# Patient Record
Sex: MALE | Race: WHITE | HISPANIC OR LATINO | Employment: FULL TIME | URBAN - METROPOLITAN AREA
[De-identification: names, ages, dates, MRNs, and addresses within clinical notes are randomized per-mention and may not be internally consistent; named-entity substitution may affect disease eponyms.]

---

## 2023-01-27 ENCOUNTER — OCCMED (OUTPATIENT)
Dept: URGENT CARE | Facility: CLINIC | Age: 38
End: 2023-01-27

## 2023-01-27 ENCOUNTER — APPOINTMENT (OUTPATIENT)
Dept: RADIOLOGY | Facility: CLINIC | Age: 38
End: 2023-01-27

## 2023-01-27 DIAGNOSIS — S50.02XA CONTUSION OF LEFT ELBOW, INITIAL ENCOUNTER: ICD-10-CM

## 2023-01-27 DIAGNOSIS — S42.401A CLOSED FRACTURE OF RIGHT ELBOW, INITIAL ENCOUNTER: Primary | ICD-10-CM

## 2023-01-27 DIAGNOSIS — M25.521 RIGHT ELBOW PAIN: ICD-10-CM

## 2023-01-27 DIAGNOSIS — S20.211A RIB CONTUSION, RIGHT, INITIAL ENCOUNTER: ICD-10-CM

## 2023-01-27 DIAGNOSIS — R07.81 RIB PAIN ON RIGHT SIDE: ICD-10-CM

## 2023-01-30 ENCOUNTER — OCCMED (OUTPATIENT)
Dept: URGENT CARE | Facility: CLINIC | Age: 38
End: 2023-01-30

## 2023-01-30 DIAGNOSIS — S42.401D CLOSED FRACTURE OF DISTAL END OF RIGHT HUMERUS WITH ROUTINE HEALING, UNSPECIFIED FRACTURE MORPHOLOGY, SUBSEQUENT ENCOUNTER: Primary | ICD-10-CM

## 2023-01-31 ENCOUNTER — HOSPITAL ENCOUNTER (OUTPATIENT)
Dept: CT IMAGING | Facility: CLINIC | Age: 38
Discharge: HOME | End: 2023-01-31

## 2023-01-31 VITALS
DIASTOLIC BLOOD PRESSURE: 77 MMHG | BODY MASS INDEX: 26.55 KG/M2 | WEIGHT: 196 LBS | HEIGHT: 72 IN | HEART RATE: 85 BPM | SYSTOLIC BLOOD PRESSURE: 127 MMHG

## 2023-01-31 DIAGNOSIS — S52.121A CLOSED DISPLACED FRACTURE OF HEAD OF RIGHT RADIUS, INITIAL ENCOUNTER: ICD-10-CM

## 2023-01-31 DIAGNOSIS — S52.121A CLOSED DISPLACED FRACTURE OF HEAD OF RIGHT RADIUS, INITIAL ENCOUNTER: Primary | ICD-10-CM

## 2023-01-31 RX ORDER — IBUPROFEN 800 MG/1
TABLET ORAL EVERY 6 HOURS PRN
COMMUNITY
End: 2023-02-10
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
## 2023-02-07 ENCOUNTER — OFFICE VISIT (OUTPATIENT)
Dept: OBGYN CLINIC | Facility: MEDICAL CENTER | Age: 38
End: 2023-02-07

## 2023-02-07 ENCOUNTER — APPOINTMENT (OUTPATIENT)
Dept: RADIOLOGY | Facility: MEDICAL CENTER | Age: 38
End: 2023-02-07

## 2023-02-07 VITALS
DIASTOLIC BLOOD PRESSURE: 77 MMHG | BODY MASS INDEX: 29.69 KG/M2 | HEIGHT: 72 IN | HEART RATE: 61 BPM | WEIGHT: 219.2 LBS | SYSTOLIC BLOOD PRESSURE: 131 MMHG

## 2023-02-07 DIAGNOSIS — S53.104A CLOSED DISLOCATION OF RIGHT ELBOW, INITIAL ENCOUNTER: ICD-10-CM

## 2023-02-07 DIAGNOSIS — M25.522 PAIN IN LEFT ELBOW: ICD-10-CM

## 2023-02-07 DIAGNOSIS — S52.121A CLOSED DISPLACED FRACTURE OF HEAD OF RIGHT RADIUS, INITIAL ENCOUNTER: ICD-10-CM

## 2023-02-07 DIAGNOSIS — M25.522 PAIN IN LEFT ELBOW: Primary | ICD-10-CM

## 2023-02-07 RX ORDER — CHLORHEXIDINE GLUCONATE 0.12 MG/ML
15 RINSE ORAL ONCE
Status: CANCELLED | OUTPATIENT
Start: 2023-02-07 | End: 2023-02-07

## 2023-02-07 RX ORDER — CHLORHEXIDINE GLUCONATE 4 G/100ML
SOLUTION TOPICAL DAILY PRN
Status: CANCELLED | OUTPATIENT
Start: 2023-02-07

## 2023-02-07 RX ORDER — CEFAZOLIN SODIUM 2 G/50ML
2000 SOLUTION INTRAVENOUS ONCE
Status: CANCELLED | OUTPATIENT
Start: 2023-02-10 | End: 2023-02-07

## 2023-02-07 NOTE — H&P (VIEW-ONLY)
[facility] - LINDSAY L KRAK[facility] CARE SPECIALISTS [facility] RUSH Steinberg [address] [place] [id number]       Leia Tavera  [id number]  1985    ORTHOPAEDIC SURGERY OUTPATIENT NOTE  2/7/2023      HISTORY:  44 y o  male with right elbow fracture dislocation who presents for follow and review of his CT scan  The patinet states that he has had continued stiffness of the right elbow range of motion and notes pain in the left elbow and wrist with new bruising  The patient has had no new numbness or tingling in either bilateral upper extremity  A  was offered, but the patient opted to proceed utilizing a family member for interpretation  History reviewed  No pertinent past medical history  History reviewed  No pertinent surgical history  Social History     Socioeconomic History   • Marital status: /Civil Union     Spouse name: Not on file   • Number of children: Not on file   • Years of education: Not on file   • Highest education level: Not on file   Occupational History   • Not on file   Tobacco Use   • Smoking status: Every Day     Packs/day: 1 00     Types: Cigarettes   • Smokeless tobacco: Never   Substance and Sexual Activity   • Alcohol use: Not on file   • Drug use: Not on file   • Sexual activity: Not on file   Other Topics Concern   • Not on file   Social History Narrative   • Not on file     Social Determinants of Health     Financial Resource Strain: Not on file   Food Insecurity: Not on file   Transportation Needs: Not on file   Physical Activity: Not on file   Stress: Not on file   Social Connections: Not on file   Intimate Partner Violence: Not on file   Housing Stability: Not on file       History reviewed  No pertinent family history       Patient's Medications   New Prescriptions    No medications on file   Previous Medications    IBUPROFEN (MOTRIN) 800 MG TABLET    Take by mouth every 6 (six) hours as needed for mild pain   Modified Medications    No medications on file   Discontinued Medications    No medications on file       No Known Allergies     /77 (BP Location: Left arm, Patient Position: Sitting, Cuff Size: Standard)   Pulse 61   Ht 6' (1 829 m)   Wt 99 4 kg (219 lb 3 2 oz)   BMI 29 73 kg/m²      REVIEW OF SYSTEMS:  Constitutional: Negative  HEENT: Negative  Respiratory: Negative  Skin: Negative  Neurological: Negative  Psychiatric/Behavioral: Negative  Musculoskeletal: Negative except for that mentioned in the HPI      /77 (BP Location: Left arm, Patient Position: Sitting, Cuff Size: Standard)   Pulse 61   Ht 6' (1 829 m)   Wt 99 4 kg (219 lb 3 2 oz)   BMI 29 73 kg/m²   Gen: No acute distress, resting comfortably in bed  HEENT: Eyes clear, moist mucus membranes, hearing intact  Respiratory: No audible wheezing or stridor  Cardiovascular: Well Perfused peripherally, 2+ distal pulse  Abdomen: nondistended, no peritoneal signs     PHYSICAL EXAM:    RIGHT ELBOW:    Appearance: ecchymosis laterally, intact skin    Flexion: 1200 degrees  Extension: 40 degrees  Pronation: 80 degrees  Supination: 80 degrees    TTP Lateral Epicondyle: negative  TTP Medial Epicondyle: negative  TTP Olecranon: negative  TTP Radial Head: positive  TTP Biceps Tendon: negative    Strength: deferred     Pain with resisted wrist extension: negative  Pain with resisted 3rd finger extension: negative  Pain with resisted wrist flexion: negative    Stability exam: deferred due to pain    Cubital tunnel Tinel's: negative    Radial/median/ulnar nerve intact    <2 sec cap refill    LEFT ELBOW:    Appearance: ecchymosis over the medial elbow    Flexion: 140 degrees  Extension: 0 degrees  Pronation: 80 degrees  Supination: 80 degrees    TTP Lateral Epicondyle: negative  TTP Medial Epicondyle: negative  TTP Olecranon: negative  TTP Radial Head: positive  TTP Biceps Tendon: negative    Strength:  Flexion: 5/5  Extension: 5/5  Pronation: 5/5  Supination: 5/5    Pain with resisted wrist extension: negative  Pain with resisted 3rd finger extension: negative  Pain with resisted wrist flexion: negative    Varus laxity: negative  Valgus laxity: negative  Milking maneuver: negative  Moving valgus stress test: negative    Cubital tunnel Tinel's: negative    Radial/median/ulnar nerve intact    <2 sec cap refill          IMAGING:  CT scan right elbow was reviewed and my independent read is as follows: right radial head fracture with comminution and 2 loose bodies posteriorly medial and laterally in the joint  Nondisplaced coronoid fracture  X-ray left elbow was reviewed and my independent interpretation is as follows: left concentrically reduced elbow with a nondisplaced radial head fracture    ASSESSMENT AND PLAN:  44 y o  male  With right radial head fracture with comminution and two loose bodies and left nondisplaced radial head fracture  Discussion was had with the patient that he will require operative intervention for his pathology  Treatment options include ORIF with arthroscopic vs  Open loose body removal vs  Possible radial head arthoplasty  The patient will be examined under anesthesia to assess for ligamentous injury and that depending on the exam he may also require ligamentous repair  He was advised that he will not require surgical intervention for the left elbow, but should continue range of motion exercises and avoid lifting greater than 5 lbs  The patient understands the risks and benefits of the procedure with risks including pain, stiffness, infection, neurovascular injury, recurrence of symptoms, failure of surgical procedure, inadvertent intraoperative complications, blood loss, blood clots, allergic reaction to anesthesia, stroke, heart attack, all up to and including to death  The patient understood and did consent for surgery today       The case request was placed and informed consent obtained utilizing the patient's family for translation per patient request  Return to clinic post op for suture removal   Repeat x-rays of the bilateral elbows at his post op visit

## 2023-02-07 NOTE — PROGRESS NOTES
[facility] - LINDSAY L KRAK[facility] CARE SPECIALISTS [facility] RUSH Steinberg [address] [place] [id number]       Melanie Hoffmann  [id number]  1985    ORTHOPAEDIC SURGERY OUTPATIENT NOTE  2/7/2023      HISTORY:  44 y o  male with right elbow fracture dislocation who presents for follow and review of his CT scan  The patinet states that he has had continued stiffness of the right elbow range of motion and notes pain in the left elbow and wrist with new bruising  The patient has had no new numbness or tingling in either bilateral upper extremity  A  was offered, but the patient opted to proceed utilizing a family member for interpretation  History reviewed  No pertinent past medical history  History reviewed  No pertinent surgical history  Social History     Socioeconomic History   • Marital status: /Civil Union     Spouse name: Not on file   • Number of children: Not on file   • Years of education: Not on file   • Highest education level: Not on file   Occupational History   • Not on file   Tobacco Use   • Smoking status: Every Day     Packs/day: 1 00     Types: Cigarettes   • Smokeless tobacco: Never   Substance and Sexual Activity   • Alcohol use: Not on file   • Drug use: Not on file   • Sexual activity: Not on file   Other Topics Concern   • Not on file   Social History Narrative   • Not on file     Social Determinants of Health     Financial Resource Strain: Not on file   Food Insecurity: Not on file   Transportation Needs: Not on file   Physical Activity: Not on file   Stress: Not on file   Social Connections: Not on file   Intimate Partner Violence: Not on file   Housing Stability: Not on file       History reviewed  No pertinent family history       Patient's Medications   New Prescriptions    No medications on file   Previous Medications    IBUPROFEN (MOTRIN) 800 MG TABLET    Take by mouth every 6 (six) hours as needed for mild pain   Modified Medications    No medications on file   Discontinued Medications    No medications on file       No Known Allergies     /77 (BP Location: Left arm, Patient Position: Sitting, Cuff Size: Standard)   Pulse 61   Ht 6' (1 829 m)   Wt 99 4 kg (219 lb 3 2 oz)   BMI 29 73 kg/m²      REVIEW OF SYSTEMS:  Constitutional: Negative  HEENT: Negative  Respiratory: Negative  Skin: Negative  Neurological: Negative  Psychiatric/Behavioral: Negative  Musculoskeletal: Negative except for that mentioned in the HPI      /77 (BP Location: Left arm, Patient Position: Sitting, Cuff Size: Standard)   Pulse 61   Ht 6' (1 829 m)   Wt 99 4 kg (219 lb 3 2 oz)   BMI 29 73 kg/m²   Gen: No acute distress, resting comfortably in bed  HEENT: Eyes clear, moist mucus membranes, hearing intact  Respiratory: No audible wheezing or stridor  Cardiovascular: Well Perfused peripherally, 2+ distal pulse  Abdomen: nondistended, no peritoneal signs     PHYSICAL EXAM:    RIGHT ELBOW:    Appearance: ecchymosis laterally, intact skin    Flexion: 1200 degrees  Extension: 40 degrees  Pronation: 80 degrees  Supination: 80 degrees    TTP Lateral Epicondyle: negative  TTP Medial Epicondyle: negative  TTP Olecranon: negative  TTP Radial Head: positive  TTP Biceps Tendon: negative    Strength: deferred     Pain with resisted wrist extension: negative  Pain with resisted 3rd finger extension: negative  Pain with resisted wrist flexion: negative    Stability exam: deferred due to pain    Cubital tunnel Tinel's: negative    Radial/median/ulnar nerve intact    <2 sec cap refill    LEFT ELBOW:    Appearance: ecchymosis over the medial elbow    Flexion: 140 degrees  Extension: 0 degrees  Pronation: 80 degrees  Supination: 80 degrees    TTP Lateral Epicondyle: negative  TTP Medial Epicondyle: negative  TTP Olecranon: negative  TTP Radial Head: positive  TTP Biceps Tendon: negative    Strength:  Flexion: 5/5  Extension: 5/5  Pronation: 5/5  Supination: 5/5    Pain with resisted wrist extension: negative  Pain with resisted 3rd finger extension: negative  Pain with resisted wrist flexion: negative    Varus laxity: negative  Valgus laxity: negative  Milking maneuver: negative  Moving valgus stress test: negative    Cubital tunnel Tinel's: negative    Radial/median/ulnar nerve intact    <2 sec cap refill          IMAGING:  CT scan right elbow was reviewed and my independent read is as follows: right radial head fracture with comminution and 2 loose bodies posteriorly medial and laterally in the joint  Nondisplaced coronoid fracture  X-ray left elbow was reviewed and my independent interpretation is as follows: left concentrically reduced elbow with a nondisplaced radial head fracture    ASSESSMENT AND PLAN:  44 y o  male  With right radial head fracture with comminution and two loose bodies and left nondisplaced radial head fracture  Discussion was had with the patient that he will require operative intervention for his pathology  Treatment options include ORIF with arthroscopic vs  Open loose body removal vs  Possible radial head arthoplasty  The patient will be examined under anesthesia to assess for ligamentous injury and that depending on the exam he may also require ligamentous repair  He was advised that he will not require surgical intervention for the left elbow, but should continue range of motion exercises and avoid lifting greater than 5 lbs  The patient understands the risks and benefits of the procedure with risks including pain, stiffness, infection, neurovascular injury, recurrence of symptoms, failure of surgical procedure, inadvertent intraoperative complications, blood loss, blood clots, allergic reaction to anesthesia, stroke, heart attack, all up to and including to death  The patient understood and did consent for surgery today       The case request was placed and informed consent obtained utilizing the patient's family for translation per patient request  Return to clinic post op for suture removal   Repeat x-rays of the bilateral elbows at his post op visit

## 2023-02-08 NOTE — TELEPHONE ENCOUNTER
Caller: [employer]    Doctor: Rose Conway    Reason for call: Requested office visit note to be faxed over,  Fax # [phone]    Notes were electronically faxed    Call back#: [phone]

## 2023-02-09 NOTE — PRE-PROCEDURE INSTRUCTIONS
Pre-Surgery Instructions:   Medication Instructions   • ibuprofen (MOTRIN) 800 mg tablet Stop taking   Have you had / have a sore throat? No  Have you had / have a cough less than 1 week? No  Have you had / have a fever greater than 100 0 - 100  4? No  Are you experiencing any shortness of breath? No    Review with patient via phone medications and showering instructions  Advised don't take NSAID's, ok tylenol products  Advised ASC call with surgery schedule time, nothing eat or drink after midnight  Verbalized understanding  Advise Smoking Cessation Education is interested

## 2023-02-10 ENCOUNTER — ANESTHESIA (OUTPATIENT)
Dept: PERIOP | Facility: HOSPITAL | Age: 38
End: 2023-02-10

## 2023-02-10 ENCOUNTER — HOSPITAL ENCOUNTER (OUTPATIENT)
Facility: HOSPITAL | Age: 38
Setting detail: OUTPATIENT SURGERY
Discharge: HOME | End: 2023-02-10
Attending: ORTHOPAEDIC SURGERY | Admitting: ORTHOPAEDIC SURGERY

## 2023-02-10 ENCOUNTER — APPOINTMENT (OUTPATIENT)
Dept: RADIOLOGY | Facility: HOSPITAL | Age: 38
End: 2023-02-10

## 2023-02-10 ENCOUNTER — ANESTHESIA EVENT (OUTPATIENT)
Dept: PERIOP | Facility: HOSPITAL | Age: 38
End: 2023-02-10

## 2023-02-10 VITALS
WEIGHT: 215.8 LBS | DIASTOLIC BLOOD PRESSURE: 60 MMHG | TEMPERATURE: 98 F | SYSTOLIC BLOOD PRESSURE: 122 MMHG | BODY MASS INDEX: 29.23 KG/M2 | RESPIRATION RATE: 16 BRPM | OXYGEN SATURATION: 94 % | HEIGHT: 72 IN | HEART RATE: 73 BPM

## 2023-02-10 DIAGNOSIS — S52.121A CLOSED DISPLACED FRACTURE OF HEAD OF RIGHT RADIUS, INITIAL ENCOUNTER: Primary | ICD-10-CM

## 2023-02-10 DIAGNOSIS — T65.291A TOXIC EFFECT OF TOBACCO AND NICOTINE, ACCIDENTAL OR UNINTENTIONAL, INITIAL ENCOUNTER: ICD-10-CM

## 2023-02-10 DEVICE — BIOTRAK® 2.0MM X 30MM PIN
Type: IMPLANTABLE DEVICE | Status: FUNCTIONAL
Brand: ACUMED

## 2023-02-10 DEVICE — BIOTRAK® 30MM HELICAL NAIL
Type: IMPLANTABLE DEVICE | Status: FUNCTIONAL
Brand: ACUMED

## 2023-02-10 DEVICE — BIOTRAK® 2.0MM X 20MM PIN
Type: IMPLANTABLE DEVICE | Status: FUNCTIONAL
Brand: ACUMED

## 2023-02-10 RX ORDER — CHLORHEXIDINE GLUCONATE 0.12 MG/ML
15 RINSE ORAL ONCE
Status: COMPLETED | OUTPATIENT
Start: 2023-02-10 | End: 2023-02-10

## 2023-02-10 RX ORDER — FENTANYL CITRATE/PF 50 MCG/ML
50 SYRINGE (ML) INJECTION
Status: DISCONTINUED | OUTPATIENT
Start: 2023-02-10 | End: 2023-02-10 | Stop reason: HOSPADM

## 2023-02-10 RX ORDER — BUPIVACAINE HYDROCHLORIDE 5 MG/ML
INJECTION, SOLUTION EPIDURAL; INTRACAUDAL AS NEEDED
Status: DISCONTINUED | OUTPATIENT
Start: 2023-02-10 | End: 2023-02-10

## 2023-02-10 RX ORDER — ONDANSETRON 2 MG/ML
4 INJECTION INTRAMUSCULAR; INTRAVENOUS ONCE AS NEEDED
Status: DISCONTINUED | OUTPATIENT
Start: 2023-02-10 | End: 2023-02-10 | Stop reason: HOSPADM

## 2023-02-10 RX ORDER — HYDROMORPHONE HYDROCHLORIDE 2 MG/ML
INJECTION, SOLUTION INTRAMUSCULAR; INTRAVENOUS; SUBCUTANEOUS AS NEEDED
Status: DISCONTINUED | OUTPATIENT
Start: 2023-02-10 | End: 2023-02-10

## 2023-02-10 RX ORDER — OXYCODONE HYDROCHLORIDE 5 MG/1
5 TABLET ORAL EVERY 4 HOURS PRN
Qty: 20 TABLET | Refills: 0 | Status: SHIPPED | OUTPATIENT
Start: 2023-02-10 | End: 2023-02-20

## 2023-02-10 RX ORDER — DEXAMETHASONE SODIUM PHOSPHATE 10 MG/ML
INJECTION, SOLUTION INTRAMUSCULAR; INTRAVENOUS AS NEEDED
Status: DISCONTINUED | OUTPATIENT
Start: 2023-02-10 | End: 2023-02-10

## 2023-02-10 RX ORDER — FENTANYL CITRATE 50 UG/ML
INJECTION, SOLUTION INTRAMUSCULAR; INTRAVENOUS AS NEEDED
Status: DISCONTINUED | OUTPATIENT
Start: 2023-02-10 | End: 2023-02-10

## 2023-02-10 RX ORDER — SODIUM CHLORIDE, SODIUM LACTATE, POTASSIUM CHLORIDE, CALCIUM CHLORIDE 600; 310; 30; 20 MG/100ML; MG/100ML; MG/100ML; MG/100ML
50 INJECTION, SOLUTION INTRAVENOUS CONTINUOUS
Status: DISCONTINUED | OUTPATIENT
Start: 2023-02-10 | End: 2023-02-10 | Stop reason: HOSPADM

## 2023-02-10 RX ORDER — LIDOCAINE HYDROCHLORIDE 10 MG/ML
INJECTION, SOLUTION EPIDURAL; INFILTRATION; INTRACAUDAL; PERINEURAL AS NEEDED
Status: DISCONTINUED | OUTPATIENT
Start: 2023-02-10 | End: 2023-02-10

## 2023-02-10 RX ORDER — KETOROLAC TROMETHAMINE 30 MG/ML
INJECTION, SOLUTION INTRAMUSCULAR; INTRAVENOUS AS NEEDED
Status: DISCONTINUED | OUTPATIENT
Start: 2023-02-10 | End: 2023-02-10

## 2023-02-10 RX ORDER — METOCLOPRAMIDE HYDROCHLORIDE 5 MG/ML
10 INJECTION INTRAMUSCULAR; INTRAVENOUS ONCE AS NEEDED
Status: DISCONTINUED | OUTPATIENT
Start: 2023-02-10 | End: 2023-02-10 | Stop reason: HOSPADM

## 2023-02-10 RX ORDER — MIDAZOLAM HYDROCHLORIDE 2 MG/2ML
INJECTION, SOLUTION INTRAMUSCULAR; INTRAVENOUS AS NEEDED
Status: DISCONTINUED | OUTPATIENT
Start: 2023-02-10 | End: 2023-02-10

## 2023-02-10 RX ORDER — PROPOFOL 10 MG/ML
INJECTION, EMULSION INTRAVENOUS AS NEEDED
Status: DISCONTINUED | OUTPATIENT
Start: 2023-02-10 | End: 2023-02-10

## 2023-02-10 RX ORDER — ROCURONIUM BROMIDE 10 MG/ML
INJECTION, SOLUTION INTRAVENOUS AS NEEDED
Status: DISCONTINUED | OUTPATIENT
Start: 2023-02-10 | End: 2023-02-10

## 2023-02-10 RX ORDER — CHLORHEXIDINE GLUCONATE 4 G/100ML
SOLUTION TOPICAL DAILY PRN
Status: DISCONTINUED | OUTPATIENT
Start: 2023-02-10 | End: 2023-02-10 | Stop reason: HOSPADM

## 2023-02-10 RX ORDER — SODIUM CHLORIDE, SODIUM LACTATE, POTASSIUM CHLORIDE, CALCIUM CHLORIDE 600; 310; 30; 20 MG/100ML; MG/100ML; MG/100ML; MG/100ML
INJECTION, SOLUTION INTRAVENOUS CONTINUOUS PRN
Status: DISCONTINUED | OUTPATIENT
Start: 2023-02-10 | End: 2023-02-10

## 2023-02-10 RX ORDER — ALBUTEROL SULFATE 2.5 MG/3ML
2.5 SOLUTION RESPIRATORY (INHALATION) ONCE AS NEEDED
Status: DISCONTINUED | OUTPATIENT
Start: 2023-02-10 | End: 2023-02-10 | Stop reason: HOSPADM

## 2023-02-10 RX ORDER — CEPHALEXIN 500 MG/1
500 CAPSULE ORAL EVERY 6 HOURS SCHEDULED
Qty: 8 CAPSULE | Refills: 0 | Status: SHIPPED | OUTPATIENT
Start: 2023-02-10 | End: 2023-02-12

## 2023-02-10 RX ORDER — ONDANSETRON 2 MG/ML
INJECTION INTRAMUSCULAR; INTRAVENOUS AS NEEDED
Status: DISCONTINUED | OUTPATIENT
Start: 2023-02-10 | End: 2023-02-10

## 2023-02-10 RX ORDER — HYDROMORPHONE HCL/PF 1 MG/ML
0.5 SYRINGE (ML) INJECTION
Status: DISCONTINUED | OUTPATIENT
Start: 2023-02-10 | End: 2023-02-10 | Stop reason: HOSPADM

## 2023-02-10 RX ORDER — CEFAZOLIN SODIUM 2 G/50ML
2000 SOLUTION INTRAVENOUS ONCE
Status: COMPLETED | OUTPATIENT
Start: 2023-02-10 | End: 2023-02-10

## 2023-02-10 RX ADMIN — CEFAZOLIN SODIUM 2000 MG: 2 SOLUTION INTRAVENOUS at 14:10

## 2023-02-10 RX ADMIN — BUPIVACAINE HYDROCHLORIDE 20 ML: 5 INJECTION, SOLUTION EPIDURAL; INTRACAUDAL; PERINEURAL at 17:10

## 2023-02-10 RX ADMIN — CHLORHEXIDINE GLUCONATE 0.12% ORAL RINSE 15 ML: 1.2 LIQUID ORAL at 13:05

## 2023-02-10 RX ADMIN — ROCURONIUM BROMIDE 20 MG: 50 INJECTION, SOLUTION INTRAVENOUS at 15:46

## 2023-02-10 RX ADMIN — MIDAZOLAM 2 MG: 1 INJECTION INTRAMUSCULAR; INTRAVENOUS at 14:10

## 2023-02-10 RX ADMIN — HYDROMORPHONE HYDROCHLORIDE 1 MG: 2 INJECTION, SOLUTION INTRAMUSCULAR; INTRAVENOUS; SUBCUTANEOUS at 14:35

## 2023-02-10 RX ADMIN — ONDANSETRON 4 MG: 2 INJECTION INTRAMUSCULAR; INTRAVENOUS at 16:13

## 2023-02-10 RX ADMIN — FENTANYL CITRATE 50 MCG: 50 INJECTION, SOLUTION INTRAMUSCULAR; INTRAVENOUS at 14:16

## 2023-02-10 RX ADMIN — LIDOCAINE HYDROCHLORIDE 50 MG: 10 INJECTION, SOLUTION EPIDURAL; INFILTRATION; INTRACAUDAL; PERINEURAL at 14:16

## 2023-02-10 RX ADMIN — SODIUM CHLORIDE, SODIUM LACTATE, POTASSIUM CHLORIDE, AND CALCIUM CHLORIDE: .6; .31; .03; .02 INJECTION, SOLUTION INTRAVENOUS at 14:13

## 2023-02-10 RX ADMIN — DEXAMETHASONE SODIUM PHOSPHATE 10 MG: 10 INJECTION, SOLUTION INTRAMUSCULAR; INTRAVENOUS at 14:16

## 2023-02-10 RX ADMIN — PROPOFOL 200 MG: 10 INJECTION, EMULSION INTRAVENOUS at 14:16

## 2023-02-10 RX ADMIN — SUGAMMADEX 200 MG: 100 INJECTION, SOLUTION INTRAVENOUS at 16:37

## 2023-02-10 RX ADMIN — ROCURONIUM BROMIDE 20 MG: 50 INJECTION, SOLUTION INTRAVENOUS at 14:50

## 2023-02-10 RX ADMIN — KETOROLAC TROMETHAMINE 30 MG: 30 INJECTION, SOLUTION INTRAMUSCULAR at 16:08

## 2023-02-10 RX ADMIN — ROCURONIUM BROMIDE 50 MG: 50 INJECTION, SOLUTION INTRAVENOUS at 14:16

## 2023-02-10 NOTE — ANESTHESIA PREPROCEDURE EVALUATION
Procedure:  OPEN REDUCTION W/ INTERNAL FIXATION (ORIF) ELBOW (Right: Elbow)  ARTHROPLASTY RADIAL HEAD (Right: Wrist)  ARTHROSCOPY ELBOW Loose body removal (Right: Elbow)  RECONSTRUCTION LIGAMENT ELBOW (Right: Elbow)    Relevant Problems   No relevant active problems        Physical Exam    Airway    Mallampati score: II  TM Distance: >3 FB  Neck ROM: full     Dental       Cardiovascular      Pulmonary      Other Findings        Anesthesia Plan  ASA Score- 1     Anesthesia Type- general with ASA Monitors  Additional Monitors:   Airway Plan:     Comment: Possible post-op nerve block          Plan Factors-Exercise tolerance (METS): >4 METS  Chart reviewed  Patient is not a current smoker  Patient did not smoke on day of surgery  Obstructive sleep apnea risk education given perioperatively  Induction- intravenous  Postoperative Plan- Plan for postoperative opioid use  Planned trial extubation    Informed Consent- Anesthetic plan and risks discussed with patient  I personally reviewed this patient with the CRNA  Discussed and agreed on the Anesthesia Plan with the CRNA  Juju Morris Anesthesia plan and consent discussed with Dorian Gutierrez who expressed understanding and agreement  Risks/benefits and alternatives discussed with patient including possible PONV, sore throat, damage to teeth/lips/gums and possibility of rare anesthetic and surgical emergencies

## 2023-02-10 NOTE — INTERVAL H&P NOTE
H&P reviewed  After examining the patient I find no changes in the patients condition since the H&P had been written  Vitals:    02/10/23 1251   BP: 127/75   Pulse: 73   Resp: 18   Temp: 97 8 °F (36 6 °C)   SpO2: 97%     Plan for right elbow arthroscopy, removal loose body, ORIF vs radial head replacement, possible LUCL repair with internal brace

## 2023-02-10 NOTE — ANESTHESIA POSTPROCEDURE EVALUATION
Post-Op Assessment Note    CV Status:  Stable  Pain Score: 0    Pain management: adequate  Multimodal analgesia used between 6 hours prior to anesthesia start to PACU discharge    Mental Status:  Alert   Hydration Status:  Stable   PONV Controlled:  None   Two or more mitigation strategies used for obstructive sleep apnea   Post Op Vitals Reviewed: Yes      Staff: Anesthesiologist         No notable events documented      BP   139/79   Temp 98 2   Pulse 87   Resp 12   SpO2 99

## 2023-02-10 NOTE — ANESTHESIA PROCEDURE NOTES
Peripheral Block    Patient location during procedure: post-op  Start time: 2/10/2023 5:10 PM  Reason for block: at surgeon's request and post-op pain management  Staffing  Performed: Anesthesiologist   Preanesthetic Checklist  Completed: patient identified, IV checked, site marked, risks and benefits discussed, surgical consent, monitors and equipment checked, pre-op evaluation and timeout performed  Peripheral Block  Patient position: supine  Prep: ChloraPrep  Patient monitoring: continuous pulse ox and frequent blood pressure checks  Block type: supraclavicular  Laterality: right  Injection technique: single-shot  Procedures: ultrasound guided, Ultrasound guidance required for the procedure to increase accuracy and safety of medication placement and decrease risk of complications  Ultrasound permanent image saved  Needle  Needle type: pencil-tip   Needle length: 5 cm  Needle localization: anatomical landmarks and ultrasound guidance  Test dose: negative  Assessment  Injection assessment: incremental injection and local visualized surrounding nerve on ultrasound  Paresthesia pain: none  Heart rate change: no  Slow fractionated injection: yes  Post-procedure:  site cleaned  patient tolerated the procedure well with no immediate complications  Additional Notes  Single shot right supraclavicular nerve block  Patient requested block post-op after deferring pre-op  Surgeon aware and agreed

## 2023-02-10 NOTE — OP NOTE
OPERATIVE REPORT  PATIENT NAME: Yoni Gomez    :  1985  MRN: [id number]  Pt Location: [facility] OR ROOM 01    SURGERY DATE: 2/10/2023    Surgeon(s) and Role:     * Johana Jaeger - Primary     * Elia Morley PA-C - Assisting     * Duane Beverage, MD - Assisting    Preop Diagnosis:  Closed displaced fracture of head of right radius, initial encounter [S52 121A]  Closed dislocation of right elbow, initial encounter [S53 104A]    Post-Op Diagnosis Codes:     * Closed displaced fracture of head of right radius, initial encounter [S52 121A]     * Closed dislocation of right elbow, initial encounter [Z97 168E]    Procedure(s):  Right - OPEN REDUCTION W/ INTERNAL FIXATION (ORIF) ELBOW, radial head  Right - ARTHROSCOPY ELBOW Loose body removal      Specimen(s):  * No specimens in log *    Estimated Blood Loss:   Minimal    Drains:  * No LDAs found *    Anesthesia Type:   Choice    Operative Indications:  Closed displaced fracture of head of right radius, initial encounter [S52 121A]  Closed dislocation of right elbow, initial encounter [B25 405I]      Operative Findings:  0 5 cm length loose body posterior medial compartment  Displaced fracture of the radial head involving 3 fragments  Complications:   None    Procedure and Technique:  The patient was seen in the preoperative holding area where the operative extremity was marked  He was taken to the operating room and placed in the lateral decubitus position  His right upper extremity was prepped and draped in usual sterile fashion  A timeout was called and patient was administered Ancef 2 g IV prior to incision  Arthroscopy of the elbow was first performed to address the loose body in the posterior compartment    20 mL of normal saline was insufflated into the joint through the soft spot portal   An 11 blade knife was used to make a direct posterior portal and accessory posterior lateral portal   Visualization the posterior compartment demonstrated the 0 5 cm loose body in the medial gutter  Using an arthroscopic grasper this loose body was removed  Once this was done the portal holes were closed with 3-0 nylon  Attention was then brought to the radial head fracture  A Jovel stand was brought in and the arm was placed on the Jovel stand  A lateral incision was made using a 15 blade knife  Subcutaneous dissection was taken down to the [facility]  An EDC split approach was then performed using a 15 blade knife avoiding the lateral collateral ligament complex  The annular ligament was incised and an arthrotomy was made exposing the radial head  The a radial head was fractured and had 3 fragments  One fragment was found using an arthroscopic scopic Imelda scope posterior to the capitellum  This was brought out of the wound and placed onto the radial head fracture bed  The other fracture fragment was just medial to this and remained in place  The fracture bed was debrided down to bleeding bone using curettes and dental pick  The 2 large fragments were then fixated to the radial head in near anatomic alignment using Acumed helical pins from the bio track set  This provided adequate fixation of the fragments to allow immediate range of motion with no signs of instability  This was tested with pronation and supination of the elbow and showed that the fragments were well fixated  Intraoperative fluoroscopy demonstrated near anatomic alignment of the fracture fragments of the radial head  Range of motion demonstrated that the fracture fragments were again stable with flexion extension and pronation and supination  The wound was copiously irrigated  The annular ligament was repaired using #1 Vicryl in interrupted stitch fashion  The fascia was closed with 0 Vicryl in a running locking stitch  The skin was closed with 2-0 and 3-0 Monocryl  Exofin was placed  Mepilex dressing was placed on top of the portal hole wounds as well as the lateral incision wound  The patient was placed in a long posterior splint in pronation  The patient tolerated procedure well  There were no complications of the case  The patient was placed in PACU in stable condition     I was present for the entire procedure and A physician assistant was required during the procedure for retraction tissue handling,dissection and suturing    Patient Disposition:  PACU         SIGNATURE: Johana Sigifredodeniatimmy  DATE: February 10, 2023  TIME: 4:19 PM

## 2023-02-10 NOTE — DISCHARGE INSTR - AVS FIRST PAGE
Johana Jaeger DO    Orthopedic Surgery, Shoulder/Elbow and Sports Medicine  [facility]   [address] [place], [place] [facility] [address] [facility] [place]  Phone: [phone]    General Post-op Surgical Instructions:    Date of Procedure - 02/10/23     Procedure - Right elbow arthroscopy, removal of loose body, ORIF radial head    Weight Bearing Status - nonweightbearing right arm, maintain sling and splint    DVT Prophylaxis and Duration - not needed    PT/OT Instructions - will be discussed at first post op visit    Stitches/Staples - Will be removed at 7-10 days postop; we will then place steristrips on incision site    Wound Care - maintain splint/dressing  Do not remove, keep clean and dry  Xray follow up - to be done at or prior to office visit follow-up if indicated    Additional Info - Please complete your course of antibiotics     Any questions or concerns please call [phone] please!

## 2023-02-21 ENCOUNTER — OFFICE VISIT (OUTPATIENT)
Dept: OBGYN CLINIC | Facility: MEDICAL CENTER | Age: 38
End: 2023-02-21

## 2023-02-21 ENCOUNTER — APPOINTMENT (OUTPATIENT)
Dept: RADIOLOGY | Facility: MEDICAL CENTER | Age: 38
End: 2023-02-21

## 2023-02-21 VITALS
BODY MASS INDEX: 30.02 KG/M2 | SYSTOLIC BLOOD PRESSURE: 148 MMHG | WEIGHT: 221.6 LBS | HEIGHT: 72 IN | DIASTOLIC BLOOD PRESSURE: 80 MMHG | HEART RATE: 93 BPM

## 2023-02-21 DIAGNOSIS — Z48.89 AFTERCARE FOLLOWING SURGERY: Primary | ICD-10-CM

## 2023-02-21 DIAGNOSIS — Z48.89 AFTERCARE FOLLOWING SURGERY: ICD-10-CM

## 2023-02-21 NOTE — PROGRESS NOTES
[place] [facility] [place]  [place] [address] [place] [id number]       Nick Brown  [id number]  1985    ORTHOPAEDIC SURGERY OUTPATIENT NOTE  2/21/2023      HISTORY:  44 y o  male  11 days post op from right radial head ORIF, diagnostic elbow arthroscopy with removal of loose body  He has been treated nonoperatively for his left radial head fracture  The patient reports he is doing well and has been wearing a splint for his right elbow  He developed a rash over his face, trunk and penis 4 days ago at which time he stopped the oxycodone  His rash, itchiness and pustules have improved since that time, but still are present  He reports that he has full range of motion with the left elbow, but some pain if he tries to lift any heavy objects  He has had no fevers or chills  Patient is Portuguese Speaking and was offered a , but the patient declined and opted to use his family member for translation during the visit      Past Medical History:   Diagnosis Date   • No pertinent past medical history        Past Surgical History:   Procedure Laterality Date   • NO PAST SURGERIES     • [place] ARTHROSCOPY ELBOW SURGICAL W/REMOVAL LOOSE/FB Right 2/10/2023    Procedure: ARTHROSCOPY ELBOW Loose body removal;  Surgeon: Alba Hale;  Location:  MAIN OR;  Service: Orthopedics   • [place] OPEN [address] HEAD/NECK FRACTURE Right 2/10/2023    Procedure: OPEN REDUCTION W/ INTERNAL FIXATION (ORIF) ELBOW;  Surgeon: Alba Hale;  Location:  MAIN OR;  Service: Orthopedics       Social History     Socioeconomic History   • Marital status: /Civil Union     Spouse name: Not on file   • Number of children: Not on file   • Years of education: Not on file   • Highest education level: Not on file   Occupational History   • Not on file   Tobacco Use   • Smoking status: Every Day     Packs/day: 0 25     Types: Cigarettes   • Smokeless tobacco: Never   Vaping Use   • Vaping Use: Every day   • Substances: Nicotine, Flavoring   Substance and Sexual Activity   • Alcohol use: Not Currently   • Drug use: Never   • Sexual activity: Yes   Other Topics Concern   • Not on file   Social History Narrative   • Not on file     Social Determinants of Health     Financial Resource Strain: Not on file   Food Insecurity: Not on file   Transportation Needs: Not on file   Physical Activity: Not on file   Stress: Not on file   Social Connections: Not on file   Intimate Partner Violence: Not on file   Housing Stability: Not on file       Family History   Problem Relation Age of Onset   • No Known Problems Mother    • Heart disease Father    • Heart disease Paternal Grandmother         Patient's Medications    No medications on file       No Known Allergies     /80   Pulse 93   Ht 6' (1 829 m)   Wt 101 kg (221 lb 9 6 oz)   BMI 30 05 kg/m²      REVIEW OF SYSTEMS:  Constitutional: Negative  HEENT: Negative  Respiratory: Negative  Skin: Negative  Neurological: Negative  Psychiatric/Behavioral: Negative  Musculoskeletal: Negative except for that mentioned in the HPI  /80   Pulse 93   Ht 6' (1 829 m)   Wt 101 kg (221 lb 9 6 oz)   BMI 30 05 kg/m²   Gen: No acute distress, resting comfortably in bed  HEENT: Eyes clear, moist mucus membranes, hearing intact  Respiratory: No audible wheezing or stridor  Cardiovascular: Well Perfused peripherally, 2+ distal pulse  Abdomen: nondistended, no peritoneal signs     PHYSICAL EXAM:   RIGHT ELBOW:    Appearance: Incisions well appearing without erythema or drainage   Swelling and ecchymosis present about the elbow  Tenderness to the lateral elbow and talha inicisonally    Active ROM  Flexion: 110 degrees  Extension: 45 degrees  Pronation: 45degrees  Supination: 45 degrees    TTP Lateral Epicondyle: negative  TTP Medial Epicondyle: negative  TTP Olecranon: negative  TTP Radial Head: Positive  TTP Biceps Tendon: negative    Cubital tunnel Tinel's: negative    Radial/median/ulnar nerve intact    <2 sec cap refill    LEFT ELBOW:    Appearance: no ecchymosis and minimal swelling    Flexion: 140 degrees  Extension: 0 degrees  Pronation: 80 degrees  Supination: 80 degrees    TTP Lateral Epicondyle: negative  TTP Medial Epicondyle: negative  TTP Olecranon: negative  TTP Radial Head: mild tenderness  TTP Biceps Tendon: negative    Cubital tunnel Tinel's: negative    Radial/median/ulnar nerve intact    <2 sec cap refill        IMAGING:X-ray right elbow obtained and demonstrates maintained alignment of the right radial head fracture s/p ORIF  No loose bodies present    X-ray left elbow obtained and my independent interpretation is as follows: located elbow with maintenance of radial head fracture alignment  ASSESSMENT AND PLAN:  44 y o  male  11 days post op from ORIF right radial head fracture and arthroscopic removal of loose bodies from the right elbow  Nonoperative management of the left radial head fracture  The patient is doing well with no stiffness of the left elbow  Right elbow splint removed today and sutures were removed  Patient was advised to begin ROM of the right elbow with therapy - script provided  Marta splint ordered  Patient recommended to discontinue oxycodone due to allergic reaction and if his symptoms do not improve to present to his primary care for further evaluation  He may use tylenol for pain  The patient understands and agrees with the plan  Return to clinic in 4 weeks with repeat x-rays of the bilateral elbows

## 2023-03-08 ENCOUNTER — EVALUATION (OUTPATIENT)
Dept: PHYSICAL THERAPY | Facility: CLINIC | Age: 38
End: 2023-03-08

## 2023-03-08 DIAGNOSIS — M25.521 RIGHT ELBOW PAIN: ICD-10-CM

## 2023-03-08 DIAGNOSIS — M25.621 ELBOW STIFFNESS, RIGHT: Primary | ICD-10-CM

## 2023-03-08 DIAGNOSIS — Z48.89 AFTERCARE FOLLOWING SURGERY: ICD-10-CM

## 2023-03-08 NOTE — PROGRESS NOTES
PT Evaluation     Today's date: 3/8/2023  Patient name: Kashmir Robles  : 1985  MRN: [id number]  Referring provider: Liliya Chow MD  Dx:   Encounter Diagnosis     ICD-10-CM    1  Elbow stiffness, right  M25 621       2  Right elbow pain  M25 521       3  Aftercare following surgery  Z48 89 Ambulatory Referral to Physical Therapy          Start Time: 930  Stop Time: 279  Total time in clinic (min): 45 minutes    Assessment  Assessment details: Pt is a 45 y/o male presenting to physical therapy s/p R radial ORIF on 2/10  Upon examination, pt presents with impairments of decreased strength, decreased ROM, and increased pain of pt's R elbow resulting in limitations of self-care/ADLs, work/household activities, and lifting objects  Pt plan of care will focus on improving strength and ROM of pt's R elbow as well as decreasing pain and improving tolerance to functional activities  Pt would benefit from skilled physical therapy to facilitate a return to his prior level of function and a safe return to work  Impairments: abnormal or restricted ROM, activity intolerance, impaired physical strength, lacks appropriate home exercise program and pain with function  Functional limitations: self-care/ADLs, work/household activities, and lifting objects  Symptom irritability: moderateUnderstanding of Dx/Px/POC: excellent   Prognosis: good    Goals  STG - 4 weeks  1  Pt will have a subjective decrease in pain of his R elbow by 2 levels or more during household activities  2  Pt will demonstrate WNL AROM of his R elbow in all planes to facilitate a return to his prior level of function    LTG - 8 weeks  1  Pt will demonstrate 4+/5 gross strength or better of his R elbow in all planes to facilitate a return to his prior level of function  2  Pt's FOTO score will improve from 50 to 68 or better to facilitate a return to pt's prior level of function        Plan  Patient would benefit from: PT eval and skilled physical therapy  Planned modality interventions: cryotherapy, thermotherapy: hydrocollator packs and unattended electrical stimulation  Planned therapy interventions: activity modification, ADL training, behavior modification, flexibility, functional ROM exercises, graded exercise, home exercise program, joint mobilization, manual therapy, massage, Mcmahan taping, neuromuscular re-education, patient education, self care, stretching, strengthening, therapeutic activities and therapeutic exercise  Frequency: 2x week  Duration in weeks: 8  Plan of Care beginning date: 3/8/2023  Plan of Care expiration date: 5/3/2023  Treatment plan discussed with: patient        Subjective Evaluation    History of Present Illness  Date of surgery: 2/10/2023  Mechanism of injury: Pt is a 45 y/o male presenting to physical therapy s/p R radial ORIF on 2/10  On , 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 which he used his R elbow to protect himself  Pt reports going to the Urgent Care where he received an x-ray and splint  Pt reports having a follow up with an ortho surgeon where it was decided he would have surgery  Pt reports doing well after surgery, however, he is very limited with how much he can use and move his R elbow  Pt reports he is not allowed to lift more than 10-15 pounds with his RUE  Pt reports he will have decreased pain with relaxing and not using his RUE  Pt reports he is currently out of work right now and having difficulty with his household activities  Pain  Current pain ratin  At best pain ratin  At worst pain ratin  Location: R elbow  Quality: sharp  Aggravating factors: lifting  Progression: improved    Treatments  Current treatment: physical therapy  Patient Goals  Patient goals for therapy: return to work          Objective     Observations     Right Elbow   Positive for incision       Additional Observation Details  Incision fully healed on lateral R elbow    Tenderness     Additional Tenderness Details  No tenderness to palpation of pt's R elbow    Active Range of Motion     Right Elbow   Flexion: 115 degrees with pain  Extension: -30 degrees with pain  Forearm supination: 30 degrees with pain  Forearm pronation: 90 degrees     Right Wrist   Wrist flexion: WFL  Wrist extension: WFl  Radial deviation: WFL and with pain  Ulnar deviation: WFL and with pain    Strength/Myotome Testing     Right Elbow   Flexion: 3+  Extension: 3+  Forearm supination: 3+  Forearm pronation: 4-    Left Wrist/Hand      (2nd hand position)     Trial 1: 85    Trial 2: 85    Right Wrist/Hand   Wrist extension: 4-  Wrist flexion: 4-  Radial deviation: 4  Ulnar deviation: 4     (2nd hand position)     Trial 1: 60    Trial 2: 60             Precautions: ORIF radial head on 2/10      Manuals 3/8            PROM R elbow and forearm TRAMAINE                                                   Neuro Re-Ed             Education on POC, diagnosis, and HEP 8'                                                                                          Ther Ex             AROM elbow 3x10            AROM supination/pronation 3x10            Standing elbow extension stretch 1x10 10"                                                                             Ther Activity                                       Gait Training                                       Modalities

## 2023-03-14 ENCOUNTER — APPOINTMENT (OUTPATIENT)
Dept: PHYSICAL THERAPY | Facility: CLINIC | Age: 38
End: 2023-03-14

## 2023-03-21 ENCOUNTER — APPOINTMENT (OUTPATIENT)
Dept: RADIOLOGY | Facility: MEDICAL CENTER | Age: 38
End: 2023-03-21

## 2023-03-21 ENCOUNTER — OFFICE VISIT (OUTPATIENT)
Dept: OBGYN CLINIC | Facility: MEDICAL CENTER | Age: 38
End: 2023-03-21

## 2023-03-21 VITALS
WEIGHT: 220 LBS | HEIGHT: 72 IN | DIASTOLIC BLOOD PRESSURE: 73 MMHG | HEART RATE: 76 BPM | BODY MASS INDEX: 29.8 KG/M2 | SYSTOLIC BLOOD PRESSURE: 127 MMHG

## 2023-03-21 DIAGNOSIS — M25.522 PAIN IN LEFT ELBOW: ICD-10-CM

## 2023-03-21 DIAGNOSIS — Z48.89 AFTERCARE FOLLOWING SURGERY: Primary | ICD-10-CM

## 2023-03-21 DIAGNOSIS — Z48.89 AFTERCARE FOLLOWING SURGERY: ICD-10-CM

## 2023-03-21 DIAGNOSIS — S52.121A CLOSED DISPLACED FRACTURE OF HEAD OF RIGHT RADIUS, INITIAL ENCOUNTER: ICD-10-CM

## 2023-03-21 NOTE — LETTER
March 21, 2023     Patient: Sean Mora  YOB: 1985  Date of Visit: 3/21/2023      To Whom it May Concern:    Sean Mora is under my professional care  Prema Ford was seen in my office on 3/21/2023  Dayronluiz Dulac should remain out of work for 4 weeks  If you have any questions or concerns, please don't hesitate to call           Sincerely,          Johana Jaeger        CC: No Recipients

## 2023-03-21 NOTE — PROGRESS NOTES
[place] [facility] [place] [facility] [id number]       Lyndsey Muñoz  [id number]  1985    ORTHOPAEDIC SURGERY OUTPATIENT NOTE  3/21/2023      HISTORY:  44 y o  male  6 week follow up of ORIF R radial head with loose body removal, and closed treatment of L radial head fracture  States that he has doing well and has no complaints for his L elbow  For his R elbow he notes about a  2/10 pain with rest, pain 4/10 only when he twists his arm inward  He has been participating in physical therapy and notes improvement in his ROM, not feels that it is still not at baseline   He rates R elbow as 90% overall     Past Medical History:   Diagnosis Date   • No pertinent past medical history        Past Surgical History:   Procedure Laterality Date   • NO PAST SURGERIES     • [place] ARTHROSCOPY ELBOW SURGICAL W/REMOVAL LOOSE/FB Right 2/10/2023    Procedure: ARTHROSCOPY ELBOW Loose body removal;  Surgeon: Pam Mcfarland;  Location:  MAIN OR;  Service: Orthopedics   • [place] OPEN [address] HEAD/NECK FRACTURE Right 2/10/2023    Procedure: OPEN REDUCTION W/ INTERNAL FIXATION (ORIF) ELBOW;  Surgeon: Pam Mcfarland;  Location:  MAIN OR;  Service: Orthopedics       Social History     Socioeconomic History   • Marital status: /Civil Union     Spouse name: Not on file   • Number of children: Not on file   • Years of education: Not on file   • Highest education level: Not on file   Occupational History   • Not on file   Tobacco Use   • Smoking status: Every Day     Packs/day: 0 25     Types: Cigarettes   • Smokeless tobacco: Never   Vaping Use   • Vaping Use: Every day   • Substances: Nicotine, Flavoring   Substance and Sexual Activity   • Alcohol use: Not Currently   • Drug use: Never   • Sexual activity: Yes   Other Topics Concern   • Not on file   Social History Narrative   • Not on file     Social Determinants of Health     Financial Resource Strain: Not on file   Food Insecurity: Not on file   Transportation Needs: Not on file   Physical Activity: Not on file   Stress: Not on file   Social Connections: Not on file   Intimate Partner Violence: Not on file   Housing Stability: Not on file       Family History   Problem Relation Age of Onset   • No Known Problems Mother    • Heart disease Father    • Heart disease Paternal Grandmother         Patient's Medications    No medications on file       Allergies   Allergen Reactions   • Oxycodone Rash     Patient had diffuse rash from oxycodone        /73   Pulse 76   Ht 6' (1 829 m)   Wt 99 8 kg (220 lb)   BMI 29 84 kg/m²      REVIEW OF SYSTEMS:  Constitutional: Negative  HEENT: Negative  Respiratory: Negative  Skin: Negative  Neurological: Negative  Psychiatric/Behavioral: Negative  Musculoskeletal: Negative except for that mentioned in the HPI      PHYSICAL EXAM:      /73   Pulse 76   Ht 6' (1 829 m)   Wt 99 8 kg (220 lb)   BMI 29 84 kg/m²   Gen: No acute distress, resting comfortably in bed  HEENT: Eyes clear, moist mucus membranes, hearing intact  Respiratory: No audible wheezing or stridor  Cardiovascular: Well Perfused peripherally, 2+ distal pulse  Abdomen: nondistended, no peritoneal signs    R elbow:  Crepitus with pronation/supination of radial head     Flexion: 140 degrees  Extension: 6 degrees short of full extension   Pronation: 80 degrees  Supination: 80 degrees    TTP Lateral Epicondyle: negative  TTP Medial Epicondyle: negative  TTP Olecranon: negative  TTP Radial Head: negative  TTP Biceps Tendon: negative    Strength:  Flexion: 5/5  Extension: 5/5  Pronation: 5/5  Supination: 5/5    Pain with resisted wrist extension: negative  Pain with resisted 3rd finger extension: negative  Pain with resisted wrist flexion: negative  Pain with resisted elbow extension: positive    Varus laxity: negative  Valgus laxity: negative  Milking maneuver: negative  Moving valgus stress test: negative    Cubital tunnel Tinel's: negative    Radial/median/ulnar nerve intact    <2 sec cap refill      L elbow:  Flexion: 140 degrees  Extension: 0 degrees  Pronation: 80 degrees  Supination: 80 degrees    TTP Lateral Epicondyle: negative  TTP Medial Epicondyle: negative  TTP Olecranon: negative  TTP Radial Head: negative  TTP Biceps Tendon: negative    Strength:  Flexion: 5/5  Extension: 5/5  Pronation: 5/5  Supination: 5/5    Pain with resisted wrist extension: negative  Pain with resisted 3rd finger extension: negative  Pain with resisted wrist flexion: negative    Varus laxity: negative  Valgus laxity: negative  Milking maneuver: negative  Moving valgus stress test: negative    Cubital tunnel Tinel's: negative    Radial/median/ulnar nerve intact    <2 sec cap refill    Neck:   Spurling's Maneuver: negative  FROM flexion, extension, rotation, sidebending    Reflexes:   Triceps: symmetric bilaterally  Biceps: symmetric bilaterally  Brachioradialis: symmetric bilaterally      Integumentary: -  Bruising: -  Abrasion: -   Rash -  Laceration: -      IMAGING: x-rays of R and L elbow were reviewed to demonstrate interval healing of radial head fractures with maintained alignment and callous formation noted     ASSESSMENT AND PLAN:  44 y o  male  6 weeks follow up s/p ORIF R radial head fracture with loose body removal and closed treatment of L radial head fracture  He looks great today and should continue working with physical therapy at this time to improve his ROM and strength  I would like to see him back in 4 weeks for reeveluation at that time he will need repeat xr of both elbows and we will look to clear him to return to work at that visit

## 2023-03-22 ENCOUNTER — TELEPHONE (OUTPATIENT)
Dept: OBGYN CLINIC | Facility: HOSPITAL | Age: 38
End: 2023-03-22

## 2023-03-22 NOTE — TELEPHONE ENCOUNTER
Caller: Estefania Dc from [employer]    Doctor/Office: Heide/Shankar Mg    #:       What needs to be faxed: Sally Spainfoot for 3/21/23    ATTN to: Estefania Dc    Fax#: [phone]      Documents were successfully e-faxed

## 2023-08-01 ENCOUNTER — TELEPHONE (OUTPATIENT)
Age: 38
End: 2023-08-01

## 2023-08-22 ENCOUNTER — OFFICE VISIT (OUTPATIENT)
Dept: OBGYN CLINIC | Facility: MEDICAL CENTER | Age: 38
End: 2023-08-22
Payer: OTHER MISCELLANEOUS

## 2023-08-22 ENCOUNTER — APPOINTMENT (OUTPATIENT)
Dept: RADIOLOGY | Facility: MEDICAL CENTER | Age: 38
End: 2023-08-22
Payer: COMMERCIAL

## 2023-08-22 VITALS
HEIGHT: 72 IN | DIASTOLIC BLOOD PRESSURE: 80 MMHG | HEART RATE: 114 BPM | SYSTOLIC BLOOD PRESSURE: 131 MMHG | BODY MASS INDEX: 32.13 KG/M2 | WEIGHT: 237.2 LBS

## 2023-08-22 DIAGNOSIS — S52.121G CLOSED DISPLACED FRACTURE OF HEAD OF RIGHT RADIUS WITH DELAYED HEALING, SUBSEQUENT ENCOUNTER: ICD-10-CM

## 2023-08-22 DIAGNOSIS — Z87.81 S/P ORIF (OPEN REDUCTION INTERNAL FIXATION) FRACTURE: ICD-10-CM

## 2023-08-22 DIAGNOSIS — M25.521 PAIN IN RIGHT ELBOW: ICD-10-CM

## 2023-08-22 DIAGNOSIS — Z98.890 S/P ORIF (OPEN REDUCTION INTERNAL FIXATION) FRACTURE: ICD-10-CM

## 2023-08-22 DIAGNOSIS — M25.521 PAIN IN RIGHT ELBOW: Primary | ICD-10-CM

## 2023-08-22 PROCEDURE — 99213 OFFICE O/P EST LOW 20 MIN: CPT | Performed by: ORTHOPAEDIC SURGERY

## 2023-08-22 PROCEDURE — 73080 X-RAY EXAM OF ELBOW: CPT

## 2023-08-22 NOTE — PROGRESS NOTES
[place] [facility] - [address] [facility] CARE SPECIALISTS [facility] [address]       Katelin Thomas  [id number]  1985    ORTHOPAEDIC SURGERY OUTPATIENT NOTE  8/22/2023      HISTORY:  44 y.o. male history of ORIF right radial head with loose body removal and closed treatment of left radial head fracture. DOS 2/10/2023. Patient was last seen 4/18/2023, that time he was doing well. Left elbow pain was rated 0-10 and 3 out of 10 on the right. SANE score of 80%. Had completed physical therapy and resumed activities of daily living. Today the patient notes continued right elbow pain with certain motions. Pain is localized to the proximal forearm, aggravated with IR, reaching behind his back. Pain is rated 9/10 at its worst. Patient describes "cracking" with elbow extension.      Past Medical History:   Diagnosis Date   • No pertinent past medical history        Past Surgical History:   Procedure Laterality Date   • NO PAST SURGERIES     • NV ARTHROSCOPY ELBOW SURGICAL W/REMOVAL LOOSE/FB Right 2/10/2023    Procedure: ARTHROSCOPY ELBOW Loose body removal;  Surgeon: Karin Lucas;  Location:  MAIN OR;  Service: Orthopedics   • NV OPEN [address] HEAD/NECK FRACTURE Right 2/10/2023    Procedure: OPEN REDUCTION W/ INTERNAL FIXATION (ORIF) ELBOW;  Surgeon: Karin Lucas;  Location:  MAIN OR;  Service: Orthopedics       Social History     Socioeconomic History   • Marital status: /Civil Union     Spouse name: Not on file   • Number of children: Not on file   • Years of education: Not on file   • Highest education level: Not on file   Occupational History   • Not on file   Tobacco Use   • Smoking status: Every Day     Packs/day: 0.25     Types: Cigarettes   • Smokeless tobacco: Never   Vaping Use   • Vaping Use: Every day   • Substances: Nicotine, Flavoring   Substance and Sexual Activity   • Alcohol use: Not Currently   • Drug use: Never   • Sexual activity: Yes Other Topics Concern   • Not on file   Social History Narrative   • Not on file     Social Determinants of Health     Financial Resource Strain: Not on file   Food Insecurity: Not on file   Transportation Needs: Not on file   Physical Activity: Not on file   Stress: Not on file   Social Connections: Not on file   Intimate Partner Violence: Not on file   Housing Stability: Not on file       Family History   Problem Relation Age of Onset   • No Known Problems Mother    • Heart disease Father    • Heart disease Paternal Grandmother         Patient's Medications    No medications on file       Allergies   Allergen Reactions   • Oxycodone Rash     Patient had diffuse rash from oxycodone        /80 (BP Location: Right arm, Patient Position: Sitting, Cuff Size: Standard)   Pulse (!) 114   Ht 6' (1.829 m)   Wt 108 kg (237 lb 3.2 oz)   BMI 32.17 kg/m²      REVIEW OF SYSTEMS:  Constitutional: Negative. HEENT: Negative. Respiratory: Negative. Skin: Negative. Neurological: Negative. Psychiatric/Behavioral: Negative. Musculoskeletal: Negative except for that mentioned in the HPI. PHYSICAL EXAM:   Right Elbow  Elbow AROM WNL  5/5 flexion   5/5 extension     IMAGING: X-ray of the right elbow obtained today demonstrate nonunited radial head fracture with anterior loose body    ASSESSMENT AND PLAN:  44 y.o. male S/P ORIF right radial head fracture with loose body removal. DOS 2/10/2023. Based on patient's presentation today and findings on x-ray described above a CT was ordered for further evaluation. Patient will follow up after CT scan to review.      Scribe Attestation    I,:  Houston Moy am acting as a scribe while in the presence of the attending physician.:       I,:  Albert Skaggs personally performed the services described in this documentation    as scribed in my presence.:

## 2023-09-16 ENCOUNTER — HOSPITAL ENCOUNTER (OUTPATIENT)
Dept: CT IMAGING | Facility: HOSPITAL | Age: 38
Discharge: HOME | End: 2023-09-16
Attending: ORTHOPAEDIC SURGERY
Payer: COMMERCIAL

## 2023-09-16 DIAGNOSIS — Z98.890 S/P ORIF (OPEN REDUCTION INTERNAL FIXATION) FRACTURE: ICD-10-CM

## 2023-09-16 DIAGNOSIS — Z87.81 S/P ORIF (OPEN REDUCTION INTERNAL FIXATION) FRACTURE: ICD-10-CM

## 2023-09-16 DIAGNOSIS — M25.521 PAIN IN RIGHT ELBOW: ICD-10-CM

## 2023-09-16 DIAGNOSIS — S52.121G CLOSED DISPLACED FRACTURE OF HEAD OF RIGHT RADIUS WITH DELAYED HEALING, SUBSEQUENT ENCOUNTER: ICD-10-CM

## 2023-09-16 PROCEDURE — 73200 CT UPPER EXTREMITY W/O DYE: CPT

## 2023-09-16 PROCEDURE — G1004 CDSM NDSC: HCPCS

## 2023-09-25 ENCOUNTER — OFFICE VISIT (OUTPATIENT)
Dept: OBGYN CLINIC | Facility: MEDICAL CENTER | Age: 38
End: 2023-09-25
Payer: OTHER MISCELLANEOUS

## 2023-09-25 VITALS
HEIGHT: 72 IN | DIASTOLIC BLOOD PRESSURE: 84 MMHG | SYSTOLIC BLOOD PRESSURE: 138 MMHG | BODY MASS INDEX: 32.13 KG/M2 | HEART RATE: 80 BPM | WEIGHT: 237.2 LBS

## 2023-09-25 DIAGNOSIS — M25.521 PAIN IN RIGHT ELBOW: Primary | ICD-10-CM

## 2023-09-25 PROCEDURE — 99214 OFFICE O/P EST MOD 30 MIN: CPT | Performed by: ORTHOPAEDIC SURGERY

## 2023-09-25 NOTE — PROGRESS NOTES
[place] [facility] - [facility] CARE SPECIALISTS [facility] [id number]       Mary Smart  [id number]  1985    ORTHOPAEDIC SURGERY OUTPATIENT NOTE  9/25/2023      HISTORY:  44 y.o. male  Presents for f/u ct scan results. Patient today states that he has significant improved pain and can easily reach around his back without any difficulty or discomfort. He states he has been using the TENS unit with significant benefit. Denies mechanical symptoms.      Past Medical History:   Diagnosis Date   • No pertinent past medical history        Past Surgical History:   Procedure Laterality Date   • NO PAST SURGERIES     • IA ARTHROSCOPY ELBOW SURGICAL W/REMOVAL LOOSE/FB Right 2/10/2023    Procedure: ARTHROSCOPY ELBOW Loose body removal;  Surgeon: Uzma Villatoro;  Location:  MAIN OR;  Service: Orthopedics   • IA OPEN [address] HEAD/NECK FRACTURE Right 2/10/2023    Procedure: OPEN REDUCTION W/ INTERNAL FIXATION (ORIF) ELBOW;  Surgeon: Uzma Villatoro;  Location:  MAIN OR;  Service: Orthopedics       Social History     Socioeconomic History   • Marital status: /Civil Union     Spouse name: Not on file   • Number of children: Not on file   • Years of education: Not on file   • Highest education level: Not on file   Occupational History   • Not on file   Tobacco Use   • Smoking status: Every Day     Packs/day: 0.25     Types: Cigarettes   • Smokeless tobacco: Never   Vaping Use   • Vaping Use: Every day   • Substances: Nicotine, Flavoring   Substance and Sexual Activity   • Alcohol use: Not Currently   • Drug use: Never   • Sexual activity: Yes   Other Topics Concern   • Not on file   Social History Narrative   • Not on file     Social Determinants of Health     Financial Resource Strain: Not on file   Food Insecurity: Not on file   Transportation Needs: Not on file   Physical Activity: Not on file   Stress: Not on file   Social Connections: Not on file   Intimate Partner Violence: Not on file   Housing Stability: Not on file       Family History   Problem Relation Age of Onset   • No Known Problems Mother    • Heart disease Father    • Heart disease Paternal Grandmother         Patient's Medications    No medications on file       Allergies   Allergen Reactions   • Oxycodone Rash     Patient had diffuse rash from oxycodone        /84 (BP Location: Left arm, Patient Position: Sitting, Cuff Size: Standard)   Pulse 80   Ht 6' (1.829 m)   Wt 108 kg (237 lb 3.2 oz)   BMI 32.17 kg/m²      REVIEW OF SYSTEMS:  Constitutional: Negative. HEENT: Negative. Respiratory: Negative. Skin: Negative. Neurological: Negative. Psychiatric/Behavioral: Negative. Musculoskeletal: Negative except for that mentioned in the HPI.    [unfilled]     PHYSICAL EXAM:  Right elbow: FROM, no TTP later epicondyle or radial head. Mild crepitation with passive pronation and supination. No pain with varus stress. IMAGING:  CT right elbow:  Intra-articular mildly displaced radial head fracture. Pin tracks are noted after fixation. There appears to be some osseous bridging best identified on series 400 image 19 and also appreciated on axial series 2 image 86. The more   volar component of the fracture line is still well depicted. Osseous bridging estimated at approximately 30%. Intraosseous debris is also noted in this region noted laterally at the radiocapitellar articulation. ASSESSMENT AND PLAN:  44 y.o. 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 Discussed nonoperative vs operative management which would entail loose body removal and possible conversion to radial head arthroplasty. Given the patient is mainly asymptomatic and only rates pain at most 2/10 he would like to continue to monitor and hold off on surgery until December if his symptoms recur. He can follow up at that time.

## 2024-03-05 ENCOUNTER — OFFICE VISIT (OUTPATIENT)
Dept: URGENT CARE | Facility: CLINIC | Age: 39
End: 2024-03-05
Payer: COMMERCIAL

## 2024-03-05 VITALS
HEIGHT: 75 IN | WEIGHT: 238 LBS | RESPIRATION RATE: 18 BRPM | OXYGEN SATURATION: 99 % | DIASTOLIC BLOOD PRESSURE: 88 MMHG | TEMPERATURE: 97.5 F | BODY MASS INDEX: 29.59 KG/M2 | HEART RATE: 96 BPM | SYSTOLIC BLOOD PRESSURE: 122 MMHG

## 2024-03-05 DIAGNOSIS — Z02.4 DRIVER'S PERMIT PHYSICAL EXAMINATION: Primary | ICD-10-CM

## 2024-03-05 NOTE — PROGRESS NOTES
[facility]        NAME: Spike Encinas is a 38 y.o. male  : 1985    MRN: [id number]  DATE: 2024  TIME: 5:01 PM    Assessment and Plan   's permit physical examination [Z02.4]  1. 's permit physical examination          No limitations per physical exam or health history, medical clearance provided. DOT forms completed, copied, and returned to patient.     Patient Instructions     Medically cleared to obtain learner's permit while wearing corrective lenses.    Chief Complaint     Chief Complaint   Patient presents with    Annual Exam     Learner's Permit         History of Present Illness       38 year old male presents for medical clearance to obtain his learner's permit. He relates he recently moved from [place] where he did have a 's license. He denies any known medical conditions. He does not take any daily medications.         Review of Systems   Review of Systems   Constitutional:  Negative for activity change, appetite change, chills, fatigue and fever.   HENT:  Negative for congestion, rhinorrhea and sore throat.    Eyes:  Negative for visual disturbance.   Respiratory:  Negative for cough, chest tightness and shortness of breath.    Cardiovascular:  Negative for chest pain and palpitations.   Gastrointestinal:  Negative for abdominal pain, constipation, diarrhea, nausea and vomiting.   Genitourinary:  Negative for decreased urine volume and difficulty urinating.   Musculoskeletal:  Negative for arthralgias, back pain, myalgias and neck pain.   Skin:  Negative for color change and pallor.   Allergic/Immunologic: Negative for environmental allergies and food allergies.   Neurological:  Negative for dizziness, light-headedness and headaches.         Current Medications     No current outpatient medications on file.    Current Allergies     Allergies as of 2024 - Reviewed 2023   Allergen Reaction Noted    Oxycodone Rash 2023            The  "following portions of the patient's history were reviewed and updated as appropriate: allergies, current medications, past family history, past medical history, past social history, past surgical history and problem list.     Past Medical History:   Diagnosis Date    No pertinent past medical history        Past Surgical History:   Procedure Laterality Date    NO PAST SURGERIES      DE ARTHROSCOPY ELBOW SURGICAL W/REMOVAL LOOSE/FB Right 2/10/2023    Procedure: ARTHROSCOPY ELBOW Loose body removal;  Surgeon: Johana Jaeger;  Location:  MAIN OR;  Service: Orthopedics    DE OPEN [place] RADIAL HEAD/NECK FRACTURE Right 2/10/2023    Procedure: OPEN REDUCTION W/ INTERNAL FIXATION (ORIF) ELBOW;  Surgeon: Johana Jaeger;  Location:  MAIN OR;  Service: Orthopedics       Family History   Problem Relation Age of Onset    No Known Problems Mother     Heart disease Father     Heart disease Paternal Grandmother          Medications have been verified.        Objective   /88   Pulse 96   Temp 97.5 °F (36.4 °C)   Resp 18   Ht 6' 3\" (1.905 m)   Wt 108 kg (238 lb)   SpO2 99%   BMI 29.75 kg/m²        Physical Exam     Physical Exam  Vitals and nursing note reviewed.   Constitutional:       General: He is awake. He is not in acute distress.     Appearance: Normal appearance. He is well-developed and normal weight.   HENT:      Head: Normocephalic and atraumatic.      Right Ear: Hearing, tympanic membrane, ear canal and external ear normal.      Left Ear: Hearing, tympanic membrane, ear canal and external ear normal.      Nose: No congestion or rhinorrhea.      Right Turbinates: Not enlarged, swollen or pale.      Left Turbinates: Not enlarged, swollen or pale.      Right Sinus: No maxillary sinus tenderness or frontal sinus tenderness.      Left Sinus: No maxillary sinus tenderness or frontal sinus tenderness.      Mouth/Throat:      Lips: Pink.      Mouth: Mucous membranes are moist.      Pharynx: Oropharynx is clear. Uvula " midline. No oropharyngeal exudate or posterior oropharyngeal erythema.   Eyes:      Extraocular Movements: Extraocular movements intact.      Conjunctiva/sclera: Conjunctivae normal.      Pupils: Pupils are equal, round, and reactive to light.      Visual Fields: Right eye visual fields normal and left eye visual fields normal.   Cardiovascular:      Rate and Rhythm: Normal rate and regular rhythm.      Pulses: Normal pulses.      Heart sounds: Normal heart sounds.   Pulmonary:      Effort: Pulmonary effort is normal.      Breath sounds: Normal breath sounds.   Abdominal:      General: Abdomen is flat. Bowel sounds are normal.      Palpations: Abdomen is soft.   Musculoskeletal:      Cervical back: Full passive range of motion without pain, normal range of motion and neck supple.   Lymphadenopathy:      Cervical: No cervical adenopathy.   Skin:     General: Skin is warm and dry.      Findings: No abrasion, bruising, ecchymosis, rash or wound.   Neurological:      General: No focal deficit present.      Mental Status: He is alert and oriented to person, place, and time.      GCS: GCS eye subscore is 4. GCS verbal subscore is 5. GCS motor subscore is 6.      Motor: Motor function is intact.      Coordination: Coordination is intact.      Gait: Gait is intact.   Psychiatric:         Mood and Affect: Mood normal.         Behavior: Behavior normal. Behavior is cooperative.         Thought Content: Thought content normal.         Judgment: Judgment normal.

## (undated) DEVICE — GUIDE WIRE .035X8 DT: Brand: ACUMED

## (undated) DEVICE — NEEDLE SPINAL18G X 3.5 IN QUINCKE

## (undated) DEVICE — HANDPIECE NANOSCOPE

## (undated) DEVICE — LIGHT HANDLE COVER SLEEVE DISP BLUE STELLAR

## (undated) DEVICE — INTENDED FOR TISSUE SEPARATION, AND OTHER PROCEDURES THAT REQUIRE A SHARP SURGICAL BLADE TO PUNCTURE OR CUT.: Brand: BARD-PARKER ® CARBON RIB-BACK BLADES

## (undated) DEVICE — BIOTRAK® NAIL MICRODRILL 2.5MM: Brand: ACUMED

## (undated) DEVICE — GLOVE SRG BIOGEL 8

## (undated) DEVICE — GLOVE INDICATOR PI UNDERGLOVE SZ 8 BLUE

## (undated) DEVICE — 10FR FRAZIER SUCTION HANDLE: Brand: CARDINAL HEALTH

## (undated) DEVICE — MAYO STAND COVER: Brand: CONVERTORS

## (undated) DEVICE — CHLORAPREP HI-LITE 10.5ML ORANGE

## (undated) DEVICE — DRAPE SHEET THREE QUARTER

## (undated) DEVICE — BIOTRAK® 20MM HELICAL NAIL
Type: IMPLANTABLE DEVICE | Status: NON-FUNCTIONAL
Brand: ACUMED

## (undated) DEVICE — U-DRAPE: Brand: CONVERTORS

## (undated) DEVICE — IMPERVIOUS STOCKINETTE: Brand: DEROYAL

## (undated) DEVICE — DISPOSABLE EQUIPMENT COVER: Brand: SMALL TOWEL DRAPE

## (undated) DEVICE — SUT ETHILON 3-0 PS-1 18 IN 1663G

## (undated) DEVICE — Device

## (undated) DEVICE — BIOTRAK® PIN MICRODRILL 2.0MM: Brand: ACUMED

## (undated) DEVICE — GLOVE SRG BIOGEL 7.5

## (undated) DEVICE — SUT MONOCRYL 2-0 SH 27 IN Y417H

## (undated) DEVICE — ARTHROSCOPY FLOOR MAT

## (undated) DEVICE — SYRINGE 20ML LL

## (undated) DEVICE — MEDI-VAC YANKAUER SUCTION HANDLE W/STRAIGHT TIP & CONTROL VENT: Brand: CARDINAL HEALTH

## (undated) DEVICE — 10K ARTHROSCOPY INFLOW/OUTFLOW TUBE SET: Brand: 10K

## (undated) DEVICE — GROUNDING PAD UNIVERSAL SLW

## (undated) DEVICE — 3M™ IOBAN™ 2 ANTIMICROBIAL INCISE DRAPE 6640EZ: Brand: IOBAN™ 2

## (undated) DEVICE — NEPTUNE E-SEP SMOKE EVACUATION PENCIL, COATED, 70MM BLADE, PUSH BUTTON SWITCH: Brand: NEPTUNE E-SEP

## (undated) DEVICE — ADHESIVE SKIN HIGH VISCOSITY EXOFIN 1ML

## (undated) DEVICE — TUBING SUCTION 5MM X 12 FT

## (undated) DEVICE — PAD GROUNDING ADULT

## (undated) DEVICE — COBAN 4 IN STERILE

## (undated) DEVICE — 3M™ STERI-STRIP™ REINFORCED ADHESIVE SKIN CLOSURES, R1547, 1/2 IN X 4 IN (12 MM X 100 MM), 6 STRIPS/ENVELOPE: Brand: 3M™ STERI-STRIP™

## (undated) DEVICE — SUT VICRYL 2-0 CT-2 18 IN J726D

## (undated) DEVICE — PADDING CAST 4 IN  COTTON STRL

## (undated) DEVICE — DRAPE C-ARM X-RAY

## (undated) DEVICE — DRESSING MEPILEX AG BORDER POST-OP 4 X 6 IN

## (undated) DEVICE — NEEDLE 18 G X 1 1/2 SAFETY

## (undated) DEVICE — SUT MONOCRYL 3-0 PS-2 18 IN Y497G

## (undated) DEVICE — BLADE SHAVER TORPEDO 4MM 13CM  COOLCUT

## (undated) DEVICE — ACE WRAP 4 IN UNSTERILE

## (undated) DEVICE — BLADE SHAVER DISSECTOR 3.5MM 13CM COOLCUT

## (undated) DEVICE — BULB SYRINGE,IRRIGATION WITH PROTECTIVE CAP: Brand: DOVER

## (undated) DEVICE — CUFF TOURNIQUET 18 X 4 IN QUICK CONNECT DISP 1 BLADDER

## (undated) DEVICE — STERILE BETHLEHEM PLASTIC HAND: Brand: CARDINAL HEALTH

## (undated) DEVICE — SUT VICRYL 0 CT-1 27 IN J340H

## (undated) DEVICE — HEAVY DUTY TABLE COVER: Brand: CONVERTORS

## (undated) DEVICE — BIOTRAK® NAIL GUIDEWIRE .045" X 8" DT: Brand: ACUMED

## (undated) DEVICE — MEDI-VAC YANK SUCT HNDL W/TPRD BULBOUS TIP: Brand: CARDINAL HEALTH